# Patient Record
Sex: FEMALE | URBAN - METROPOLITAN AREA
[De-identification: names, ages, dates, MRNs, and addresses within clinical notes are randomized per-mention and may not be internally consistent; named-entity substitution may affect disease eponyms.]

---

## 2017-07-17 RX ORDER — PHENYLEPHRINE HYDROCHLORIDE 25 MG/ML
1 SOLUTION/ DROPS OPHTHALMIC
Status: CANCELLED | OUTPATIENT
Start: 2017-07-17

## 2017-07-18 ENCOUNTER — HOSPITAL ENCOUNTER (OUTPATIENT)
Facility: CLINIC | Age: 67
End: 2017-07-18
Attending: OPHTHALMOLOGY | Admitting: OPHTHALMOLOGY
Payer: COMMERCIAL

## 2017-08-08 RX ORDER — MULTIPLE VITAMINS W/ MINERALS TAB 9MG-400MCG
1 TAB ORAL DAILY
Status: ON HOLD | COMMUNITY
End: 2017-08-09

## 2017-08-09 ENCOUNTER — ANESTHESIA EVENT (OUTPATIENT)
Dept: SURGERY | Facility: CLINIC | Age: 67
End: 2017-08-09
Payer: COMMERCIAL

## 2017-08-09 ENCOUNTER — ANESTHESIA (OUTPATIENT)
Dept: SURGERY | Facility: CLINIC | Age: 67
End: 2017-08-09
Payer: COMMERCIAL

## 2017-08-09 ENCOUNTER — HOSPITAL ENCOUNTER (OUTPATIENT)
Facility: CLINIC | Age: 67
Discharge: HOME | End: 2017-08-09
Attending: OPHTHALMOLOGY | Admitting: OPHTHALMOLOGY
Payer: COMMERCIAL

## 2017-08-09 VITALS
DIASTOLIC BLOOD PRESSURE: 73 MMHG | BODY MASS INDEX: 25.81 KG/M2 | HEIGHT: 61 IN | SYSTOLIC BLOOD PRESSURE: 131 MMHG | TEMPERATURE: 97 F | WEIGHT: 136.69 LBS | OXYGEN SATURATION: 97 % | RESPIRATION RATE: 14 BRPM

## 2017-08-09 PROCEDURE — 27210794 ZZH OR GENERAL SUPPLY STERILE: Performed by: OPHTHALMOLOGY

## 2017-08-09 PROCEDURE — 36000104 ZZH EYE SURGERY LEVEL 4 1ST 30 MIN: Performed by: OPHTHALMOLOGY

## 2017-08-09 PROCEDURE — 36000105 ZZH EYE SURGERY LEVEL 4 EA 15 ADDTL MIN: Performed by: OPHTHALMOLOGY

## 2017-08-09 PROCEDURE — 40000170 ZZH STATISTIC PRE-PROCEDURE ASSESSMENT II: Performed by: OPHTHALMOLOGY

## 2017-08-09 PROCEDURE — V2632 POST CHMBR INTRAOCULAR LENS: HCPCS | Performed by: OPHTHALMOLOGY

## 2017-08-09 PROCEDURE — 25000308 HC RX OP HPI UCR WEL MED 250 IP 250: Performed by: OPHTHALMOLOGY

## 2017-08-09 PROCEDURE — 71000028 ZZH EYE RECOVERY PHASE 2 EACH 15 MINS: Performed by: OPHTHALMOLOGY

## 2017-08-09 PROCEDURE — 25000125 ZZHC RX 250: Performed by: ANESTHESIOLOGY

## 2017-08-09 PROCEDURE — 25000128 H RX IP 250 OP 636: Performed by: NURSE ANESTHETIST, CERTIFIED REGISTERED

## 2017-08-09 PROCEDURE — 25000128 H RX IP 250 OP 636: Performed by: OPHTHALMOLOGY

## 2017-08-09 PROCEDURE — 25000125 ZZHC RX 250: Performed by: OPHTHALMOLOGY

## 2017-08-09 PROCEDURE — 27210995 ZZH RX 272: Performed by: OPHTHALMOLOGY

## 2017-08-09 PROCEDURE — 25000128 H RX IP 250 OP 636: Performed by: ANESTHESIOLOGY

## 2017-08-09 PROCEDURE — 37000009 ZZH ANESTHESIA TECHNICAL FEE, EACH ADDTL 15 MIN: Performed by: OPHTHALMOLOGY

## 2017-08-09 PROCEDURE — 36000135 ZZH KERATOTOMY ARCUATE W FEMTOSECOND LASER/IMAGING FOR ATIOL: Performed by: OPHTHALMOLOGY

## 2017-08-09 PROCEDURE — 37000008 ZZH ANESTHESIA TECHNICAL FEE, 1ST 30 MIN: Performed by: OPHTHALMOLOGY

## 2017-08-09 PROCEDURE — S0020 INJECTION, BUPIVICAINE HYDRO: HCPCS | Performed by: OPHTHALMOLOGY

## 2017-08-09 DEVICE — EYE IMP IOL AMO PCL TECNIS ZCB00 19.0: Type: IMPLANTABLE DEVICE | Site: EYE | Status: FUNCTIONAL

## 2017-08-09 RX ORDER — NALOXONE HYDROCHLORIDE 0.4 MG/ML
.1-.4 INJECTION, SOLUTION INTRAMUSCULAR; INTRAVENOUS; SUBCUTANEOUS
Status: DISCONTINUED | OUTPATIENT
Start: 2017-08-09 | End: 2017-08-09 | Stop reason: HOSPADM

## 2017-08-09 RX ORDER — BALANCED SALT SOLUTION 6.4; .75; .48; .3; 3.9; 1.7 MG/ML; MG/ML; MG/ML; MG/ML; MG/ML; MG/ML
SOLUTION OPHTHALMIC PRN
Status: DISCONTINUED | OUTPATIENT
Start: 2017-08-09 | End: 2017-08-09 | Stop reason: HOSPADM

## 2017-08-09 RX ORDER — DEXAMETHASONE SODIUM PHOSPHATE 4 MG/ML
INJECTION, SOLUTION INTRA-ARTICULAR; INTRALESIONAL; INTRAMUSCULAR; INTRAVENOUS; SOFT TISSUE PRN
Status: DISCONTINUED | OUTPATIENT
Start: 2017-08-09 | End: 2017-08-09 | Stop reason: HOSPADM

## 2017-08-09 RX ORDER — PROPARACAINE HYDROCHLORIDE 5 MG/ML
SOLUTION/ DROPS OPHTHALMIC PRN
Status: DISCONTINUED | OUTPATIENT
Start: 2017-08-09 | End: 2017-08-09 | Stop reason: HOSPADM

## 2017-08-09 RX ORDER — DICLOFENAC SODIUM 1 MG/ML
1 SOLUTION/ DROPS OPHTHALMIC
Status: COMPLETED | OUTPATIENT
Start: 2017-08-09 | End: 2017-08-09

## 2017-08-09 RX ORDER — LIDOCAINE HYDROCHLORIDE 10 MG/ML
INJECTION, SOLUTION EPIDURAL; INFILTRATION; INTRACAUDAL; PERINEURAL PRN
Status: DISCONTINUED | OUTPATIENT
Start: 2017-08-09 | End: 2017-08-09 | Stop reason: HOSPADM

## 2017-08-09 RX ORDER — ONDANSETRON 4 MG/1
4 TABLET, ORALLY DISINTEGRATING ORAL EVERY 30 MIN PRN
Status: DISCONTINUED | OUTPATIENT
Start: 2017-08-09 | End: 2017-08-09 | Stop reason: HOSPADM

## 2017-08-09 RX ORDER — PHENYLEPHRINE HYDROCHLORIDE 25 MG/ML
1 SOLUTION/ DROPS OPHTHALMIC
Status: COMPLETED | OUTPATIENT
Start: 2017-08-09 | End: 2017-08-09

## 2017-08-09 RX ORDER — SODIUM CHLORIDE, SODIUM LACTATE, POTASSIUM CHLORIDE, CALCIUM CHLORIDE 600; 310; 30; 20 MG/100ML; MG/100ML; MG/100ML; MG/100ML
INJECTION, SOLUTION INTRAVENOUS CONTINUOUS
Status: DISCONTINUED | OUTPATIENT
Start: 2017-08-09 | End: 2017-08-09 | Stop reason: HOSPADM

## 2017-08-09 RX ORDER — ONDANSETRON 2 MG/ML
INJECTION INTRAMUSCULAR; INTRAVENOUS PRN
Status: DISCONTINUED | OUTPATIENT
Start: 2017-08-09 | End: 2017-08-09

## 2017-08-09 RX ORDER — ONDANSETRON 2 MG/ML
4 INJECTION INTRAMUSCULAR; INTRAVENOUS EVERY 30 MIN PRN
Status: DISCONTINUED | OUTPATIENT
Start: 2017-08-09 | End: 2017-08-09 | Stop reason: HOSPADM

## 2017-08-09 RX ORDER — DEXAMETHASONE SODIUM PHOSPHATE 4 MG/ML
INJECTION, SOLUTION INTRA-ARTICULAR; INTRALESIONAL; INTRAMUSCULAR; INTRAVENOUS; SOFT TISSUE PRN
Status: DISCONTINUED | OUTPATIENT
Start: 2017-08-09 | End: 2017-08-09

## 2017-08-09 RX ORDER — PROPOFOL 10 MG/ML
INJECTION, EMULSION INTRAVENOUS PRN
Status: DISCONTINUED | OUTPATIENT
Start: 2017-08-09 | End: 2017-08-09

## 2017-08-09 RX ORDER — FENTANYL CITRATE 50 UG/ML
INJECTION, SOLUTION INTRAMUSCULAR; INTRAVENOUS PRN
Status: DISCONTINUED | OUTPATIENT
Start: 2017-08-09 | End: 2017-08-09

## 2017-08-09 RX ORDER — FENTANYL CITRATE 50 UG/ML
25-50 INJECTION, SOLUTION INTRAMUSCULAR; INTRAVENOUS
Status: DISCONTINUED | OUTPATIENT
Start: 2017-08-09 | End: 2017-08-09 | Stop reason: HOSPADM

## 2017-08-09 RX ORDER — TROPICAMIDE 10 MG/ML
1 SOLUTION/ DROPS OPHTHALMIC
Status: COMPLETED | OUTPATIENT
Start: 2017-08-09 | End: 2017-08-09

## 2017-08-09 RX ORDER — TIMOLOL MALEATE 5 MG/ML
SOLUTION/ DROPS OPHTHALMIC PRN
Status: DISCONTINUED | OUTPATIENT
Start: 2017-08-09 | End: 2017-08-09 | Stop reason: HOSPADM

## 2017-08-09 RX ORDER — MEPERIDINE HYDROCHLORIDE 25 MG/ML
12.5 INJECTION INTRAMUSCULAR; INTRAVENOUS; SUBCUTANEOUS
Status: DISCONTINUED | OUTPATIENT
Start: 2017-08-09 | End: 2017-08-09 | Stop reason: HOSPADM

## 2017-08-09 RX ORDER — PROPARACAINE HYDROCHLORIDE 5 MG/ML
1 SOLUTION/ DROPS OPHTHALMIC ONCE
Status: COMPLETED | OUTPATIENT
Start: 2017-08-09 | End: 2017-08-09

## 2017-08-09 RX ORDER — MOXIFLOXACIN 5 MG/ML
1 SOLUTION/ DROPS OPHTHALMIC
Status: COMPLETED | OUTPATIENT
Start: 2017-08-09 | End: 2017-08-09

## 2017-08-09 RX ORDER — CYCLOPENTOLATE HYDROCHLORIDE 10 MG/ML
1 SOLUTION/ DROPS OPHTHALMIC
Status: COMPLETED | OUTPATIENT
Start: 2017-08-09 | End: 2017-08-09

## 2017-08-09 RX ORDER — BRIMONIDINE TARTRATE 2 MG/ML
SOLUTION/ DROPS OPHTHALMIC PRN
Status: DISCONTINUED | OUTPATIENT
Start: 2017-08-09 | End: 2017-08-09 | Stop reason: HOSPADM

## 2017-08-09 RX ADMIN — FENTANYL CITRATE 25 MCG: 50 INJECTION, SOLUTION INTRAMUSCULAR; INTRAVENOUS at 08:56

## 2017-08-09 RX ADMIN — FENTANYL CITRATE 25 MCG: 50 INJECTION, SOLUTION INTRAMUSCULAR; INTRAVENOUS at 08:40

## 2017-08-09 RX ADMIN — ONDANSETRON 4 MG: 2 INJECTION INTRAMUSCULAR; INTRAVENOUS at 08:05

## 2017-08-09 RX ADMIN — CYCLOPENTOLATE HYDROCHLORIDE 1 DROP: 10 SOLUTION/ DROPS OPHTHALMIC at 06:32

## 2017-08-09 RX ADMIN — POVIDONE-IODINE 1 DROP: 5 SOLUTION OPHTHALMIC at 06:32

## 2017-08-09 RX ADMIN — CYCLOPENTOLATE HYDROCHLORIDE 1 DROP: 10 SOLUTION/ DROPS OPHTHALMIC at 06:42

## 2017-08-09 RX ADMIN — FENTANYL CITRATE 50 MCG: 50 INJECTION, SOLUTION INTRAMUSCULAR; INTRAVENOUS at 07:58

## 2017-08-09 RX ADMIN — DICLOFENAC SODIUM 1 DROP: 1 SOLUTION/ DROPS OPHTHALMIC at 06:32

## 2017-08-09 RX ADMIN — CYCLOPENTOLATE HYDROCHLORIDE 1 DROP: 10 SOLUTION/ DROPS OPHTHALMIC at 06:58

## 2017-08-09 RX ADMIN — PHENYLEPHRINE HYDROCHLORIDE 1 DROP: 2.5 SOLUTION/ DROPS OPHTHALMIC at 06:58

## 2017-08-09 RX ADMIN — PHENYLEPHRINE HYDROCHLORIDE 1 DROP: 2.5 SOLUTION/ DROPS OPHTHALMIC at 06:42

## 2017-08-09 RX ADMIN — PROPOFOL 30 MG: 10 INJECTION, EMULSION INTRAVENOUS at 08:05

## 2017-08-09 RX ADMIN — DEXAMETHASONE SODIUM PHOSPHATE 4 MG: 4 INJECTION, SOLUTION INTRA-ARTICULAR; INTRALESIONAL; INTRAMUSCULAR; INTRAVENOUS; SOFT TISSUE at 08:05

## 2017-08-09 RX ADMIN — PROPARACAINE HYDROCHLORIDE 1 DROP: 5 SOLUTION/ DROPS OPHTHALMIC at 06:31

## 2017-08-09 RX ADMIN — MIDAZOLAM HYDROCHLORIDE 2 MG: 1 INJECTION, SOLUTION INTRAMUSCULAR; INTRAVENOUS at 07:58

## 2017-08-09 RX ADMIN — SODIUM CHLORIDE, POTASSIUM CHLORIDE, SODIUM LACTATE AND CALCIUM CHLORIDE: 600; 310; 30; 20 INJECTION, SOLUTION INTRAVENOUS at 07:58

## 2017-08-09 RX ADMIN — MOXIFLOXACIN HYDROCHLORIDE 1 DROP: 5 SOLUTION/ DROPS OPHTHALMIC at 06:32

## 2017-08-09 RX ADMIN — MOXIFLOXACIN HYDROCHLORIDE 1 DROP: 5 SOLUTION/ DROPS OPHTHALMIC at 06:42

## 2017-08-09 RX ADMIN — TROPICAMIDE 1 DROP: 10 SOLUTION/ DROPS OPHTHALMIC at 06:58

## 2017-08-09 RX ADMIN — DICLOFENAC SODIUM 1 DROP: 1 SOLUTION/ DROPS OPHTHALMIC at 06:58

## 2017-08-09 RX ADMIN — TROPICAMIDE 1 DROP: 10 SOLUTION/ DROPS OPHTHALMIC at 06:42

## 2017-08-09 RX ADMIN — TROPICAMIDE 1 DROP: 10 SOLUTION/ DROPS OPHTHALMIC at 06:32

## 2017-08-09 RX ADMIN — LIDOCAINE HYDROCHLORIDE 1 ML: 10 INJECTION, SOLUTION EPIDURAL; INFILTRATION; INTRACAUDAL; PERINEURAL at 06:59

## 2017-08-09 RX ADMIN — DICLOFENAC SODIUM 1 DROP: 1 SOLUTION/ DROPS OPHTHALMIC at 06:42

## 2017-08-09 RX ADMIN — PHENYLEPHRINE HYDROCHLORIDE 1 DROP: 2.5 SOLUTION/ DROPS OPHTHALMIC at 06:32

## 2017-08-09 RX ADMIN — MOXIFLOXACIN HYDROCHLORIDE 1 DROP: 5 SOLUTION/ DROPS OPHTHALMIC at 06:58

## 2017-08-09 NOTE — ANESTHESIA POSTPROCEDURE EVALUATION
Patient: Patricia Valentin    Procedure(s):  LEFT EYE PHACOEMULSIFICATION CLEAR CORNEA WITH STANDARD INTRAOCULAR LENS IMPLANT FEMTOSECOND LASER ASSISTED  LEFT EYE VITRECTOMY PARSPLANA WITH 25 GAUGE SYSTEM MEMBRANE PEEL  - Wound Class: I-Clean   - Wound Class: I-Clean    Diagnosis:CATARACT, EPIRETINAL MEMBRANE  Diagnosis Additional Information: No value filed.    Anesthesia Type:  MAC    Note:  Anesthesia Post Evaluation    Patient location during evaluation: bedside  Patient participation: Able to fully participate in evaluation  Level of consciousness: awake  Pain management: adequate  Airway patency: patent  Cardiovascular status: acceptable  Respiratory status: acceptable  Hydration status: acceptable  PONV: none     Anesthetic complications: None    Comments: No anesthetic complications noted.         Last vitals:  Vitals:    08/09/17 0634 08/09/17 0919 08/09/17 0941   BP: 132/62 138/73 131/73   Resp: 16 14 14   Temp: 36.1  C (97  F)     SpO2: 98% 98% 97%         Electronically Signed By: Chicho Amos DO, DO  August 9, 2017  4:02 PM

## 2017-08-09 NOTE — IP AVS SNAPSHOT
[facility]    [address]    [place] [address]    Phone:  [phone]    Fax:  [phone]                                       After Visit Summary   8/9/2017    Patricia Valentin    MRN: [id number]           After Visit Summary Signature Page     I have received my discharge instructions, and my questions have been answered. I have discussed any challenges I see with this plan with the nurse or doctor.    ..........................................................................................................................................  Patient/Patient Representative Signature      ..........................................................................................................................................  Patient Representative Print Name and Relationship to Patient    ..................................................               ................................................  Date                                            Time    ..........................................................................................................................................  Reviewed by Signature/Title    ...................................................              ..............................................  Date                                                            Time

## 2017-08-09 NOTE — OP NOTE
DATE OF PROCEDURE:  08/09/2017       PREOPERATIVE DIAGNOSES:   1.  Left macular pucker.   2.  Left cataract.      POSTOPERATIVE DIAGNOSES:     1.  Left macular pucker.     2.  Left cataract.      PROCEDURES:   1.  Left vitrectomy.   2.  Left membrane peel.   3.  Left cataract extraction.      COMPLICATIONS:  None.      ESTIMATED BLOOD LOSS:  Less than 5 mL.      INDICATIONS:  To improve the vision.  The risks of the operation, including 1:1000 risk of infection, hemorrhage and potential loss of all eye sight, 1:100 risk of retinal detachment requiring more surgery, and uncertain visual prognosis were explained to her.  She wished to proceed.      DESCRIPTION OF PROCEDURE:  The cataract extraction was performed uneventfully by Dr. Merlin Christiansen.  The operation was then turned over to me.  Wire speculum was inserted in the left fornix.  The eye had already been prepped and draped in the usual fashion and the anesthetic block had already been given.  The eye was entered 4 mm posterior to the limbus in the inferotemporal quadrant with a 25-gauge trocar, which was directly visualized in the vitreous cavity and connected to 1 liter bottle of BSS to which 0.3 mL of 1:1000 adrenalin was added.  Two more 25-gauge trocars were inserted supratemporally and superonasally 4 mm posterior to the limbus.  The eye was entered with a light pipe and vitreous cutter, and a core vitrectomy was performed under wide-angle illumination.  A posterior vitreous attachment was induced with active suction, and the vitreous was removed for 360 degrees out to the vitreous base.  Attention was then turned to the posterior pole, where a macular pucker was present.  Using a combination of a barbed MVR blade, Rice pick and ILM forceps, the membrane was incised, delaminated and removed in 1 nice sheet.  Following this, a 360-degree scleral depression examination showed no peripheral retinal tears.  The 3 trocars were removed, and all 3 sclerotomies  closed with 6-0 plain gut suture.  The intraocular pressure was approximately 10.  Subconjunctival injections of Ancef and Decadron were given.  Alphagan, Timolol and Betadine were instilled in the left fornix.  The speculum was removed and the eye was patched in the usual fashion.  No complications were noted.         NÉSTOR THOMAS MD             D: 2017 09:20   T: 2017 10:29   MT: [id number]      Name:     PIEDAD KABA   MRN:      [id number]        Account:        [id number]   :      1950           Procedure Date: 2017      Document: [id number]

## 2017-08-09 NOTE — ANESTHESIA PREPROCEDURE EVALUATION
Anesthesia Evaluation     .             ROS/MED HX    ENT/Pulmonary:      (-) sleep apnea   Neurologic:  - neg neurologic ROS     Cardiovascular:  - neg cardiovascular ROS       METS/Exercise Tolerance:     Hematologic:  - neg hematologic  ROS       Musculoskeletal:  - neg musculoskeletal ROS       GI/Hepatic:        (-) GERD   Renal/Genitourinary:  - ROS Renal section negative       Endo:  - neg endo ROS       Psychiatric:     (+) psychiatric history depression      Infectious Disease:  - neg infectious disease ROS       Malignancy:         Other:                     Physical Exam  Normal systems: cardiovascular, pulmonary and dental    Airway   Mallampati: II  TM distance: >3 FB  Neck ROM: full    Dental     Cardiovascular       Pulmonary                     Anesthesia Plan      History & Physical Review  History and physical reviewed and following examination; no interval change.    ASA Status:  2 .    NPO Status:  > 8 hours    Plan for MAC Reason for MAC:  Procedure to face, neck, head or breast  PONV prophylaxis:  Ondansetron (or other 5HT-3)       Postoperative Care      Consents  Anesthetic plan, risks, benefits and alternatives discussed with:  Patient..        Procedure: Procedure(s):  COMBINED VITRECTOMY PARSPLANA, PHACOEMULSIFICATION CLEAR CORNEA WITH STANDARD INTRAOCULAR LENS IMPLANT  VITRECTOMY PARSPLANA WITH 25 GAUGE SYSTEM  Preop diagnosis: CATARACT, EPIRETINAL MEMBRANE    Allergies   Allergen Reactions     No Known Allergies      Past Medical History:   Diagnosis Date     Agoraphobia with panic disorder      Major depressive disorder, recurrent episode (H)      NO ACTIVE PROBLEMS      Vitamin D deficiency      Past Surgical History:   Procedure Laterality Date     C/SECTION, LOW TRANSVERSE      , Low Transverse     CHOLECYSTECTOMY       HERNIA REPAIR      ventral     Prior to Admission medications    Not on File     Current Facility-Administered Medications Ordered in Epic   Medication  Dose Route Frequency Last Rate Last Dose     cyclopentolate (CYCLOGYL) 1 % ophthalmic solution 1 drop  1 drop Ophthalmic q5 Min Prior to Surgery   1 drop at 08/09/17 0632     phenylephrine (MYDFRIN /JOY-SYNEPHRINE) 2.5 % ophthalmic solution 1 drop  1 drop Ophthalmic q5 Min Prior to Surgery   1 drop at 08/09/17 0632     tropicamide (MYDRIACYL) 1 % ophthalmic solution 1 drop  1 drop Ophthalmic q5 Min Prior to Surgery   1 drop at 08/09/17 0632     moxifloxacin (VIGAMOX) 0.5 % ophthalmic solution 1 drop  1 drop Ophthalmic q5 Min Prior to Surgery   1 drop at 08/09/17 0632     diclofenac (VOLTAREN) 0.1 % ophthalmic solution 1 drop  1 drop Ophthalmic q5 Min Prior to Surgery   1 drop at 08/09/17 0632     No current [facility]-ordered outpatient prescriptions on file.     Wt Readings from Last 1 Encounters:   08/08/17 62 kg (136 lb 11 oz)     Temp Readings from Last 1 Encounters:   No data found for Temp     BP Readings from Last 6 Encounters:   06/28/06 120/64   08/10/04 110/70     Pulse Readings from Last 4 Encounters:   No data found for Pulse     Resp Readings from Last 1 Encounters:   No data found for Resp     SpO2 Readings from Last 1 Encounters:   No data found for SpO2     Recent Labs   Lab Test  09/29/09   1230   NA  139   POTASSIUM  3.9   CHLORIDE  103   CO2  31   ANIONGAP  6   GLC  96   BUN  18   CR  0.76   NINA  8.9     Recent Labs   Lab Test  09/29/09   1230   AST  22   ALT  14     Recent Labs   Lab Test  09/29/09   1230   WBC  4.6   HGB  13.1   PLT  393     No results for input(s): INR in the last 33859 hours.    Invalid input(s): APTT   No results for input(s): TROPI in the last 82712 hours.  RECENT LABS:   ECG:   ECHO:   CXR:                      .

## 2017-08-09 NOTE — DISCHARGE INSTRUCTIONS
[facility] Discharge  Instructions  Anesthesia ([facility])   Adult Discharge Instructions    For 24 hours after surgery    1. Get plenty of rest.  Make arrangements to have a responsible adult stay with you for at least 6 hours after you leave the hospital.  2. Do not drive or use heavy equipment for 24 hours.    3. Do not drink alcohol for 24 hours.  4. Do not sign legal documents or make important decisions for 24 hours.  5. Avoid strenuous or risky activities. You may feel lightheaded.  If so, sit for a few minutes before standing.  Have someone help you get up.   6. Conscious sedation patients may resume a regular diet..  7. Any questions of medical nature, call your physician.    Dr. Merlin Christiansen  Post Operative Care  Following Cataract Surgery     ? If you have a gauze eye patch on, please do not remove it until it is removed by your physician at your first post-operative visit.    ? Wear the eye shield when sleeping for protection for one week.    ? Do not rub the operated eye.    ? Light sensitivity may be noticed. Sunglasses may be worn for comfort.    ? Some discomfort and irritation may be noticed. Acetaminophen (Tylenol) may be taken for discomfort.    ? Avoid excessive bending over, strenuous activity or heavy lifting until approved by your doctor.    ? Keep the operated eye dry.    ? You may wash your hair, bathe or shower, but keep the operated eye closed while doing so.    ? Use medication exactly as prescribed by your doctor.  You may restart your regular home medications.    ? Bring all materials and medications to the clinic on your first post-operative visit.    ? Call the doctor s office if any of the following occur:     -decrease in vision     -pain beyond mild sensation     -significant redness or discharge      Dr. Christiansen can reached at [phone].      [facility]  JESSICA STEWART  [address]  [place]  [address]  [phone]    PATIENT INSTRUCTIONS - RETINA SURGERY    Common retina operations    Surgery for retinal detachments.    Surgery to remove blood in the eye.    Surgery to remove scar tissue from retina.    Surgery to close macular holes.    Surgery to repair dislocated lens    After the surgery    Many retina operations involve the use of gas bubbles, or oil bubbles in the eye.  If this is true in your case, you may be asked to position a certain way following the surgery.  The nurse will go over this in detail with you.  You do not have a gas bubble or oil bubble in your eye.    When you go home, you can eat and drink as much as you feel comfortable.  Many retina operations make you feel less hungry or even a little nauseous.  This is to be expected.     You don t need to start the eye drops until the next day.  Please bring these drops with you to the doctor.      You may take a bath or shower as usual for you the day after surgury,   If the patch falls off, please simply leave it off.    It is normal to have some moderate discomfort.    If nothing was prescribed for you, you can take ibuprofen(Advil) or acetaminopen (Tylenol) as directed on the bottle. If you have significant discomfort that isn t relieved with pain medications, you should call [facility] at [phone] and speak to your doctor.    Recovering after surgery    Retina operations are not like cataract surgery.  The vision often takes weeks or months to fully improve following the surgery.  DO NOT BE DISCOURAGED IF YOU DON T SEE WELL IMMEDIATELY FOLLOWING THE SURGERY.  You will have eye drops to use over the first several weeks following the surgery.  The doctor will give you detailed instructions on when to take them on your post-op visit.    If positioning is required following the surgery, it usually is required for 5-7 days.    Most people do not feel able to return to work for at least one week and  sometimes up to two or three weeks following the surgery.    Frequently asked questions    What symptoms should concern me following surgery?    You should be concerned if:    The eye becomes increasingly more painful and the pain medication stops working.    The vision gets darker or dimmer.    Green thick discharge appears.    What are the drops for?    One drop will be an antibiotic.  It is meant to prevent infection.    One drop will be Pred Forte.  It is a steroid drop.  It is meant to reduce inflammation and promote healing.  It usually is continued for the longest time and is gradually tapered over several weeks.

## 2017-08-09 NOTE — OP NOTE
PATIENT: Patricia Valentin , : 1950     DATE OF SURGERY: 2017     SURGEON: Merlin Christiansen MD     PREOPERATIVE DIAGNOSIS: Cataract, Left  Left eye.     POSTOPERATIVE DIAGNOSIS: Cataract, Left  Left eye.      PROCEDURE : Phacoemulsification with intraocular lens implant in the Left  Left eye.  Femto Laser Assisted.     ANESTHESIA: Topical  with MAC.       INDICATIONS FOR PROCEDURE:  The patient is a 67 year old-year-old female with a history of gradually decreasing vision in the Left  Left eye.  Best corrected visual acuity had dropped to 20/40 causing difficulty with reading and driving.  A complete ophthalmic evaluation demonstrated that the cataract was a significant cause in the decreased visual acuity.  The patient also has an epiretinal membrane.  The patient was evaluated by Dr. Ric Whitlock, and Patricia opted for a combined procedure with the cataract removing being performed first and the epiretinal membrane being removed secondarily.  The benefits, risks, and alternatives of cataract surgery  (including: bleeding, infection, loss of vision, loss of the eye, need for further surgery and other rare but other possible side effects of cataract surgery) were discussed with the patient.  The patient expressed understanding of these risks and elected to proceed with surgery.  Written consent was obtained prior to the procedure.      DESCRIPTION OF PROCEDURE :  The patient's right eye was dilated in preinduction with topical drops of cyclopentolate, phenylephrine and flurbiprofen.  Topical antibiotic drops were instilled.      Patient was taken to the femto laser room and the femto laser was used to assist with capsulotomy, fragmentation, arcuate incisions, and incisions.    The patient was taken to the operating room.  A drop of tetracaine and  a drop of 5% iodine solution was placed in the eye.  The operative eye was prepped and draped in the usual sterile manner for intraocular surgery.  A lid  speculum was placed to separate the eyelids.     A paracentesis incision was opened at the 2 clock hours left of the temporal position in the Left  Left eye.  One percent preservative free intraocular lidocaine was then instilled in the eye.  Next, the anterior chamber was deepened with viscoelastic.  A limbal-based clear corneal incision was opened temporally.  A continuous curvilinear anterior capsulorrhexis was then opened using a Utrata forceps.  Next, balanced saline solution on a blunt-tip cannula was used to carry out hydrodissection and hydrodelineation and the lens nucleus was rotated freely within the lens capsule.  Phacoemulsification was then performed in the nucleus and epinucleus.  The residual cortical material was aspirated using the irrigation/aspiration handpiece.      The capsular bag was inspected and found to be intact.  The capsular bag was then deepened with additional viscoelastic.  A posterior chamber intraocular lens, TANK [id number], 19.0 diopter,  was inserted into the lens capsule and rotated into position without difficulty.  A dilute mixture of intraocular antibiotic and balanced saline solution 1:10 was injected into the anterior chamber.  The incision was inspected for leaks and found to be sealed.  The incision was closed with a single 10-0 nylon suture.  The patient tolerated the procedure well without complications. Dr. Whitlock then begin surgery to remove the epiretinal membrane.          RIRI MATHEW MD

## 2017-08-09 NOTE — BRIEF OP NOTE
preop dx - left macular pucker, cataract  Post op dx - same  Proc - left ppv, mp, cat extracton  Comp- none  ebl - zero

## 2017-08-09 NOTE — IP AVS SNAPSHOT
MRN:[id number]                      After Visit Summary   8/9/2017    Patricia Valentin    MRN: [id number]           Thank you!     Thank you for choosing [facility] for your care. Our goal is always to provide you with excellent care. Hearing back from our patients is one way we can continue to improve our services. Please take a few minutes to complete the written survey that you may receive in the mail after you visit with us. Thank you!        Patient Information     Date Of Birth          1950        About your hospital stay     You were admitted on:  August 9, 2017 You last received care in the:  [facility]    You were discharged on:  August 9, 2017       Who to Call     For medical emergencies, please call 911.  For non-urgent questions about your medical care, please call your primary care provider or clinic, [phone]  For questions related to your surgery, please call your surgery clinic        Attending Provider     Provider Merlin Mccarty MD Ophthalmology       Primary Care Provider Office Phone # Fax #    Tamia MOORE Caterina [phone]      After Care Instructions     Activity       Avoid strenuous activities the next several days.                  Further instructions from your care team       [facility] Discharge  Instructions  Anesthesia ([facility])   Adult Discharge Instructions    For 24 hours after surgery    1. Get plenty of rest.  Make arrangements to have a responsible adult stay with you for at least 6 hours after you leave the hospital.  2. Do not drive or use heavy equipment for 24 hours.    3. Do not drink alcohol for 24 hours.  4. Do not sign legal documents or make important decisions for 24 hours.  5. Avoid strenuous or risky activities. You may feel lightheaded.  If so, sit for a few minutes before standing.  Have someone help you get up.   6. Conscious sedation patients may  resume a regular diet..  7. Any questions of medical nature, call your physician.    Dr. Merlin Christiansen  Post Operative Care  Following Cataract Surgery     ? If you have a gauze eye patch on, please do not remove it until it is removed by your physician at your first post-operative visit.    ? Wear the eye shield when sleeping for protection for one week.    ? Do not rub the operated eye.    ? Light sensitivity may be noticed. Sunglasses may be worn for comfort.    ? Some discomfort and irritation may be noticed. Acetaminophen (Tylenol) may be taken for discomfort.    ? Avoid excessive bending over, strenuous activity or heavy lifting until approved by your doctor.    ? Keep the operated eye dry.    ? You may wash your hair, bathe or shower, but keep the operated eye closed while doing so.    ? Use medication exactly as prescribed by your doctor.  You may restart your regular home medications.    ? Bring all materials and medications to the clinic on your first post-operative visit.    ? Call the doctor s office if any of the following occur:     -decrease in vision     -pain beyond mild sensation     -significant redness or discharge      Dr. Christiansen can reached at [phone].      [facility]  JESSICA STEWART  [address]  [place] [address] [phone]    PATIENT INSTRUCTIONS - RETINA SURGERY    Common retina operations    Surgery for retinal detachments.    Surgery to remove blood in the eye.    Surgery to remove scar tissue from retina.    Surgery to close macular holes.    Surgery to repair dislocated lens    After the surgery    Many retina operations involve the use of gas bubbles, or oil bubbles in the eye.  If this is true in your case, you may be asked to position a certain way following the surgery.  The nurse will go over this in detail with you.  You do not have a gas bubble or oil bubble in your eye.    When you go home, you can eat and drink as  much as you feel comfortable.  Many retina operations make you feel less hungry or even a little nauseous.  This is to be expected.     You don t need to start the eye drops until the next day.  Please bring these drops with you to the doctor.      You may take a bath or shower as usual for you the day after surgury,   If the patch falls off, please simply leave it off.    It is normal to have some moderate discomfort.    If nothing was prescribed for you, you can take ibuprofen(Advil) or acetaminopen (Tylenol) as directed on the bottle. If you have significant discomfort that isn t relieved with pain medications, you should call [facility] at [phone] and speak to your doctor.    Recovering after surgery    Retina operations are not like cataract surgery.  The vision often takes weeks or months to fully improve following the surgery.  DO NOT BE DISCOURAGED IF YOU DON T SEE WELL IMMEDIATELY FOLLOWING THE SURGERY.  You will have eye drops to use over the first several weeks following the surgery.  The doctor will give you detailed instructions on when to take them on your post-op visit.    If positioning is required following the surgery, it usually is required for 5-7 days.    Most people do not feel able to return to work for at least one week and sometimes up to two or three weeks following the surgery.    Frequently asked questions    What symptoms should concern me following surgery?    You should be concerned if:    The eye becomes increasingly more painful and the pain medication stops working.    The vision gets darker or dimmer.    Green thick discharge appears.    What are the drops for?    One drop will be an antibiotic.  It is meant to prevent infection.    One drop will be Pred Forte.  It is a steroid drop.  It is meant to reduce inflammation and promote healing.  It usually is continued for the longest time and is gradually tapered over several weeks.      Pending Results     No orders  "found from 2017 to 8/10/2017.            Admission Information     Date & Time Provider Department Dept. Phone    2017 Merlin Christiansen MD [facility] [phone]      Your Vitals Were     Blood Pressure Temperature Respirations Height Weight Last Period    138/73 97  F (36.1  C) (Temporal) 14 1.549 m (5' 1\") 62 kg (136 lb 11 oz) 2006    Pulse Oximetry BMI (Body Mass Index)                98% 25.83 kg/m2          [employer] Information     [employer] lets you send messages to your doctor, view your test results, renew your prescriptions, schedule appointments and more. To sign up, go to [email or web address] . Click on \"Log in\" on the left side of the screen, which will take you to the Welcome page. Then click on \"Sign up Now\" on the right side of the page.     You will be asked to enter the access code listed below, as well as some personal information. Please follow the directions to create your username and password.     Your access code is: [email or web address]  Expires: 2017  9:39 AM     Your access code will  in 90 days. If you need help or a new code, please call your [facility] clinic or [phone].        Care EveryWhere ID     This is your Care EveryWhere ID. This could be used by other organizations to access your [facility] medical records  [id number]        Equal Access to Services     SAURABH MARCELINO AH: Hadii marika goldmano Soshanthi, waaxda luqadaha, qaybta kaalmada adeegyada, ross young. So [facility] [phone].    ATENCIÓN: Si habla español, tiene a michel disposición servicios gratuitos de asistencia lingüística. Llame al [phone].    We comply with applicable federal civil rights laws and [place] laws. We do not discriminate on the basis of race, color, national origin, age, disability sex, sexual orientation or gender identity.               Review of your medicines      Notice     You have not been prescribed any medications.             " Protect others around you: Learn how to safely use, store and throw away your medicines at www.disposemymeds.org.             Medication List: This is a list of all your medications and when to take them. Check marks below indicate your daily home schedule. Keep this list as a reference.      Notice     You have not been prescribed any medications.

## 2017-08-17 ENCOUNTER — HOSPITAL ENCOUNTER (OUTPATIENT)
Facility: CLINIC | Age: 67
End: 2017-08-17
Attending: OPHTHALMOLOGY | Admitting: OPHTHALMOLOGY
Payer: COMMERCIAL

## 2017-10-05 ENCOUNTER — SURGERY (OUTPATIENT)
Age: 67
End: 2017-10-05

## 2017-10-05 ENCOUNTER — HOSPITAL ENCOUNTER (OUTPATIENT)
Facility: CLINIC | Age: 67
Discharge: HOME | End: 2017-10-05
Attending: OPHTHALMOLOGY | Admitting: OPHTHALMOLOGY
Payer: COMMERCIAL

## 2017-10-05 ENCOUNTER — ANESTHESIA (OUTPATIENT)
Dept: SURGERY | Facility: CLINIC | Age: 67
End: 2017-10-05
Payer: COMMERCIAL

## 2017-10-05 ENCOUNTER — ANESTHESIA EVENT (OUTPATIENT)
Dept: SURGERY | Facility: CLINIC | Age: 67
End: 2017-10-05
Payer: COMMERCIAL

## 2017-10-05 VITALS
HEIGHT: 61 IN | OXYGEN SATURATION: 98 % | WEIGHT: 135 LBS | RESPIRATION RATE: 16 BRPM | TEMPERATURE: 98.7 F | DIASTOLIC BLOOD PRESSURE: 74 MMHG | BODY MASS INDEX: 25.49 KG/M2 | SYSTOLIC BLOOD PRESSURE: 130 MMHG

## 2017-10-05 PROCEDURE — 25000128 H RX IP 250 OP 636: Performed by: NURSE ANESTHETIST, CERTIFIED REGISTERED

## 2017-10-05 PROCEDURE — 40000170 ZZH STATISTIC PRE-PROCEDURE ASSESSMENT II: Performed by: OPHTHALMOLOGY

## 2017-10-05 PROCEDURE — 36000101 ZZH EYE SURGERY LEVEL 3 1ST 30 MIN: Performed by: OPHTHALMOLOGY

## 2017-10-05 PROCEDURE — 37000008 ZZH ANESTHESIA TECHNICAL FEE, 1ST 30 MIN: Performed by: OPHTHALMOLOGY

## 2017-10-05 PROCEDURE — V2632 POST CHMBR INTRAOCULAR LENS: HCPCS | Performed by: OPHTHALMOLOGY

## 2017-10-05 PROCEDURE — 25000128 H RX IP 250 OP 636: Performed by: OPHTHALMOLOGY

## 2017-10-05 PROCEDURE — 27210794 ZZH OR GENERAL SUPPLY STERILE: Performed by: OPHTHALMOLOGY

## 2017-10-05 PROCEDURE — 71000028 ZZH EYE RECOVERY PHASE 2 EACH 15 MINS: Performed by: OPHTHALMOLOGY

## 2017-10-05 PROCEDURE — 25000125 ZZHC RX 250: Performed by: OPHTHALMOLOGY

## 2017-10-05 PROCEDURE — 25000128 H RX IP 250 OP 636: Performed by: ANESTHESIOLOGY

## 2017-10-05 PROCEDURE — 36000135 ZZH KERATOTOMY ARCUATE W FEMTOSECOND LASER/IMAGING FOR ATIOL: Performed by: OPHTHALMOLOGY

## 2017-10-05 DEVICE — EYE IMP IOL AMO PCL TECNIS ZCB00 19.0: Type: IMPLANTABLE DEVICE | Site: EYE | Status: FUNCTIONAL

## 2017-10-05 RX ORDER — MOXIFLOXACIN 5 MG/ML
1 SOLUTION/ DROPS OPHTHALMIC
Status: COMPLETED | OUTPATIENT
Start: 2017-10-05 | End: 2017-10-05

## 2017-10-05 RX ORDER — TROPICAMIDE 10 MG/ML
1 SOLUTION/ DROPS OPHTHALMIC
Status: COMPLETED | OUTPATIENT
Start: 2017-10-05 | End: 2017-10-05

## 2017-10-05 RX ORDER — FENTANYL CITRATE 50 UG/ML
INJECTION, SOLUTION INTRAMUSCULAR; INTRAVENOUS PRN
Status: DISCONTINUED | OUTPATIENT
Start: 2017-10-05 | End: 2017-10-05

## 2017-10-05 RX ORDER — BALANCED SALT SOLUTION 6.4; .75; .48; .3; 3.9; 1.7 MG/ML; MG/ML; MG/ML; MG/ML; MG/ML; MG/ML
SOLUTION OPHTHALMIC PRN
Status: DISCONTINUED | OUTPATIENT
Start: 2017-10-05 | End: 2017-10-05 | Stop reason: HOSPADM

## 2017-10-05 RX ORDER — TIMOLOL MALEATE 5 MG/ML
SOLUTION/ DROPS OPHTHALMIC PRN
Status: DISCONTINUED | OUTPATIENT
Start: 2017-10-05 | End: 2017-10-05 | Stop reason: HOSPADM

## 2017-10-05 RX ORDER — DICLOFENAC SODIUM 1 MG/ML
1 SOLUTION/ DROPS OPHTHALMIC
Status: COMPLETED | OUTPATIENT
Start: 2017-10-05 | End: 2017-10-05

## 2017-10-05 RX ORDER — PREDNISOLONE ACETATE 1 %
SUSPENSION, DROPS(FINAL DOSAGE FORM)(ML) OPHTHALMIC (EYE) PRN
Status: DISCONTINUED | OUTPATIENT
Start: 2017-10-05 | End: 2017-10-05 | Stop reason: HOSPADM

## 2017-10-05 RX ORDER — PROPARACAINE HYDROCHLORIDE 5 MG/ML
SOLUTION/ DROPS OPHTHALMIC PRN
Status: DISCONTINUED | OUTPATIENT
Start: 2017-10-05 | End: 2017-10-05 | Stop reason: HOSPADM

## 2017-10-05 RX ORDER — TETRACAINE HYDROCHLORIDE 5 MG/ML
SOLUTION OPHTHALMIC PRN
Status: DISCONTINUED | OUTPATIENT
Start: 2017-10-05 | End: 2017-10-05 | Stop reason: HOSPADM

## 2017-10-05 RX ORDER — SODIUM CHLORIDE, SODIUM LACTATE, POTASSIUM CHLORIDE, CALCIUM CHLORIDE 600; 310; 30; 20 MG/100ML; MG/100ML; MG/100ML; MG/100ML
INJECTION, SOLUTION INTRAVENOUS CONTINUOUS
Status: DISCONTINUED | OUTPATIENT
Start: 2017-10-05 | End: 2017-10-05 | Stop reason: HOSPADM

## 2017-10-05 RX ORDER — ONDANSETRON 2 MG/ML
INJECTION INTRAMUSCULAR; INTRAVENOUS PRN
Status: DISCONTINUED | OUTPATIENT
Start: 2017-10-05 | End: 2017-10-05

## 2017-10-05 RX ORDER — PROPARACAINE HYDROCHLORIDE 5 MG/ML
1 SOLUTION/ DROPS OPHTHALMIC ONCE
Status: COMPLETED | OUTPATIENT
Start: 2017-10-05 | End: 2017-10-05

## 2017-10-05 RX ORDER — PHENYLEPHRINE HYDROCHLORIDE 25 MG/ML
1 SOLUTION/ DROPS OPHTHALMIC
Status: COMPLETED | OUTPATIENT
Start: 2017-10-05 | End: 2017-10-05

## 2017-10-05 RX ORDER — SODIUM CHLORIDE, SODIUM LACTATE, POTASSIUM CHLORIDE, CALCIUM CHLORIDE 600; 310; 30; 20 MG/100ML; MG/100ML; MG/100ML; MG/100ML
500 INJECTION, SOLUTION INTRAVENOUS CONTINUOUS
Status: DISCONTINUED | OUTPATIENT
Start: 2017-10-05 | End: 2017-10-05 | Stop reason: HOSPADM

## 2017-10-05 RX ORDER — LIDOCAINE HYDROCHLORIDE 10 MG/ML
INJECTION, SOLUTION EPIDURAL; INFILTRATION; INTRACAUDAL; PERINEURAL PRN
Status: DISCONTINUED | OUTPATIENT
Start: 2017-10-05 | End: 2017-10-05 | Stop reason: HOSPADM

## 2017-10-05 RX ADMIN — MOXIFLOXACIN 1 DROP: 5 SOLUTION/ DROPS OPHTHALMIC at 06:32

## 2017-10-05 RX ADMIN — PHENYLEPHRINE HYDROCHLORIDE 1 DROP: 2.5 SOLUTION/ DROPS OPHTHALMIC at 06:32

## 2017-10-05 RX ADMIN — TROPICAMIDE 1 DROP: 10 SOLUTION/ DROPS OPHTHALMIC at 06:25

## 2017-10-05 RX ADMIN — MIDAZOLAM HYDROCHLORIDE 1 MG: 1 INJECTION, SOLUTION INTRAMUSCULAR; INTRAVENOUS at 08:09

## 2017-10-05 RX ADMIN — TROPICAMIDE 1 DROP: 10 SOLUTION/ DROPS OPHTHALMIC at 06:15

## 2017-10-05 RX ADMIN — BALANCED SALT SOLUTION 15 ML: 6.4; .75; .48; .3; 3.9; 1.7 SOLUTION OPHTHALMIC at 07:59

## 2017-10-05 RX ADMIN — DICLOFENAC SODIUM 1 DROP: 1 SOLUTION OPHTHALMIC at 06:25

## 2017-10-05 RX ADMIN — MOXIFLOXACIN 1 DROP: 5 SOLUTION/ DROPS OPHTHALMIC at 06:25

## 2017-10-05 RX ADMIN — TIMOLOL MALEATE 1 DROP: 5 SOLUTION OPHTHALMIC at 08:23

## 2017-10-05 RX ADMIN — PROPARACAINE HYDROCHLORIDE 1 DROP: 5 SOLUTION/ DROPS OPHTHALMIC at 06:15

## 2017-10-05 RX ADMIN — TETRACAINE HYDROCHLORIDE 1 DROP: 5 SOLUTION OPHTHALMIC at 08:09

## 2017-10-05 RX ADMIN — BALANCED SALT SOLUTION 15 ML: 6.4; .75; .48; .3; 3.9; 1.7 SOLUTION OPHTHALMIC at 08:13

## 2017-10-05 RX ADMIN — TROPICAMIDE 1 DROP: 10 SOLUTION/ DROPS OPHTHALMIC at 06:32

## 2017-10-05 RX ADMIN — Medication 0.1 ML: at 08:22

## 2017-10-05 RX ADMIN — ONDANSETRON 4 MG: 2 INJECTION INTRAMUSCULAR; INTRAVENOUS at 08:25

## 2017-10-05 RX ADMIN — EPINEPHRINE 500 ML: 1 INJECTION, SOLUTION, CONCENTRATE INTRAVENOUS at 08:13

## 2017-10-05 RX ADMIN — MIDAZOLAM HYDROCHLORIDE 1 MG: 1 INJECTION, SOLUTION INTRAMUSCULAR; INTRAVENOUS at 08:06

## 2017-10-05 RX ADMIN — SODIUM CHONDROITIN SULFATE / SODIUM HYALURONATE 1 ML: 0.55-0.5 INJECTION INTRAOCULAR at 08:14

## 2017-10-05 RX ADMIN — SODIUM CHLORIDE, SODIUM LACTATE, POTASSIUM CHLORIDE, CALCIUM CHLORIDE: 600; 310; 30; 20 INJECTION, SOLUTION INTRAVENOUS at 08:06

## 2017-10-05 RX ADMIN — POVIDONE-IODINE 20 DROP: 5 SOLUTION OPHTHALMIC at 06:16

## 2017-10-05 RX ADMIN — PROPARACAINE HYDROCHLORIDE 1 DROP: 5 SOLUTION/ DROPS OPHTHALMIC at 07:58

## 2017-10-05 RX ADMIN — DICLOFENAC SODIUM 1 DROP: 1 SOLUTION OPHTHALMIC at 06:32

## 2017-10-05 RX ADMIN — MOXIFLOXACIN 1 DROP: 5 SOLUTION/ DROPS OPHTHALMIC at 06:15

## 2017-10-05 RX ADMIN — FENTANYL CITRATE 25 MCG: 50 INJECTION, SOLUTION INTRAMUSCULAR; INTRAVENOUS at 08:10

## 2017-10-05 RX ADMIN — Medication 1 DROP: at 08:23

## 2017-10-05 RX ADMIN — LIDOCAINE HYDROCHLORIDE 1 ML: 10 INJECTION, SOLUTION EPIDURAL; INFILTRATION; INTRACAUDAL; PERINEURAL at 08:14

## 2017-10-05 RX ADMIN — DICLOFENAC SODIUM 1 DROP: 1 SOLUTION OPHTHALMIC at 06:15

## 2017-10-05 RX ADMIN — PHENYLEPHRINE HYDROCHLORIDE 1 DROP: 2.5 SOLUTION/ DROPS OPHTHALMIC at 06:25

## 2017-10-05 RX ADMIN — PHENYLEPHRINE HYDROCHLORIDE 1 DROP: 2.5 SOLUTION/ DROPS OPHTHALMIC at 06:15

## 2017-10-05 NOTE — IP AVS SNAPSHOT
MRN:[id number]                      After Visit Summary   10/5/2017    Patricia Valentin    MRN: [id number]           Thank you!     Thank you for choosing [facility] for your care. Our goal is always to provide you with excellent care. Hearing back from our patients is one way we can continue to improve our services. Please take a few minutes to complete the written survey that you may receive in the mail after you visit with us. Thank you!        Patient Information     Date Of Birth          1950        About your hospital stay     You were admitted on:  October 5, 2017 You last received care in the:  [facility]    You were discharged on:  October 5, 2017       Who to Call     For medical emergencies, please call 911.  For non-urgent questions about your medical care, please call your primary care provider or clinic, [phone]  For questions related to your surgery, please call your surgery clinic        Attending Provider     Provider Specialty    Merlin Christiansen MD Ophthalmology       Primary Care Provider Office Phone # Fax #    Tamia MOORE Caterina [phone]      Further instructions from your care team       [facility] Discharge  Instructions  [facility]est[facility] ([facility])   Adult Discharge Instructions    For 24 hours after surgery    1. Get plenty of rest.  Make arrangements to have a responsible adult stay with you for at least 6 hours after you leave the hospital.  2. Do not drive or use heavy equipment for 24 hours.    3. Do not drink alcohol for 24 hours.  4. Do not sign legal documents or make important decisions for 24 hours.  5. Avoid strenuous or risky activities. You may feel lightheaded.  If so, sit for a few minutes before standing.  Have someone help you get up.   6. Conscious sedation patients may resume a regular diet..  7. Any questions of medical nature, call your physician.      Dr. Boyer  "Елена  Post Operative Care  Following Cataract Surgery     ? If you have a gauze eye patch on, please do not remove it until it is removed by your physician at your first post-operative visit.    ? If you have a clear eye shield on, you may remove it  and begin eye drops as directed when you get home.    ? Wear the eye shield when sleeping for protection for one week.    ? Do not rub the operated eye.    ? Light sensitivity may be noticed. Sunglasses may be worn for comfort.    ? Some discomfort and irritation may be noticed. Acetaminophen (Tylenol) may be taken for discomfort.    ? Avoid excessive bending over, strenuous activity or heavy lifting until approved by your doctor.    ? Keep the operated eye dry.    ? You may wash your hair, bathe or shower, but keep the operated eye closed while doing so.    ? Use medication exactly as prescribed by your doctor.  You may restart your regular home medications.    ? Bring all materials and medications to the clinic on your first post-operative visit.    ? Call the doctor s office if any of the following occur:     -decrease in vision     -pain beyond mild sensation     -significant redness or discharge      Dr. Christiansen can reached at [phone].      Pending Results     No orders found from 10/3/2017 to 10/6/2017.            Admission Information     Date & Time Provider Department Dept. Phone    10/5/2017 Merlin Christiansen MD [facility] [phone]      Your Vitals Were     Blood Pressure Temperature Respirations Height Weight Last Period    140/77 98.7  F (37.1  C) (Temporal) 16 1.549 m (5' 1\") 61.2 kg (135 lb) 05/07/2006    Pulse Oximetry BMI (Body Mass Index)                98% 25.51 kg/m2          [employer] Information     [employer] lets you send messages to your doctor, view your test results, renew your prescriptions, schedule appointments and more. To sign up, go to [email or web address] . Click on \"Log in\" on the left side of the screen, " "which will take you to the Welcome page. Then click on \"Sign up Now\" on the right side of the page.     You will be asked to enter the access code listed below, as well as some personal information. Please follow the directions to create your username and password.     Your access code is: [email or web address]  Expires: 2017  9:39 AM     Your access code will  in 90 days. If you need help or a new code, please call your [facility] clinic or [phone].        Care EveryWhere ID     This is your Care EveryWhere ID. This could be used by other organizations to access your [facility] medical records  [id number]        Equal Access to Services     SAURABH MARCELINO : Rex Julien, rashel masterson, kyle hananh, ross young. So [facility] [phone].    ATENCIÓN: Si habla español, tiene a michel disposición servicios gratuitos de asistencia lingüística. Llame al [phone].    We comply with applicable federal civil rights laws and [place] laws. We do not discriminate on the basis of race, color, national origin, age, disability, sex, sexual orientation, or gender identity.               Review of your medicines      UNREVIEWED medicines. Ask your doctor about these medicines        Dose / Directions    VITAMIN D PO        Refills:  0                Protect others around you: Learn how to safely use, store and throw away your medicines at www.disposemymeds.org.             Medication List: This is a list of all your medications and when to take them. Check marks below indicate your daily home schedule. Keep this list as a reference.      Medications           Morning Afternoon Evening Bedtime As Needed    VITAMIN D PO                                  "

## 2017-10-05 NOTE — IP AVS SNAPSHOT
[facility]    [address]    [place] [address]    Phone:  [phone]    Fax:  [phone]                                       After Visit Summary   10/5/2017    Patricia Valentin    MRN: [id number]           After Visit Summary Signature Page     I have received my discharge instructions, and my questions have been answered. I have discussed any challenges I see with this plan with the nurse or doctor.    ..........................................................................................................................................  Patient/Patient Representative Signature      ..........................................................................................................................................  Patient Representative Print Name and Relationship to Patient    ..................................................               ................................................  Date                                            Time    ..........................................................................................................................................  Reviewed by Signature/Title    ...................................................              ..............................................  Date                                                            Time

## 2017-10-05 NOTE — ANESTHESIA POSTPROCEDURE EVALUATION
Patient: Patricia Valentin    Procedure(s):  RIGHT EYE FEMTOSECOND LASER ASSISTED PHACOEMULSIFICATION CLEAR CORNEA WITH STANDARD INTRAOCULAR LENS IMPLANT  - Wound Class: I-Clean    Diagnosis:RIGHT EYE CATARACT   Diagnosis Additional Information: No value filed.    Anesthesia Type:  MAC    Note:  Anesthesia Post Evaluation    Patient location during evaluation: PACU  Patient participation: Able to fully participate in evaluation  Level of consciousness: awake  Pain management: adequate  Airway patency: patent  Cardiovascular status: acceptable  Respiratory status: acceptable  Hydration status: acceptable  PONV: none     Anesthetic complications: None          Last vitals:  Vitals:    10/05/17 0624 10/05/17 0832 10/05/17 0844   BP: 122/74 140/77 130/74   Resp: 16 16 16   Temp: 37.1  C (98.7  F)     SpO2: 99% 98% 98%         Electronically Signed By: VIET FALCON MD  October 5, 2017  3:33 PM

## 2017-10-05 NOTE — ANESTHESIA PREPROCEDURE EVALUATION
Anesthesia Evaluation     .             ROS/MED HX    ENT/Pulmonary:      (-) sleep apnea   Neurologic:       Cardiovascular:         METS/Exercise Tolerance:     Hematologic:         Musculoskeletal:         GI/Hepatic:        (-) GERD   Renal/Genitourinary:         Endo:         Psychiatric: Comment: Agoraphobia with panic disorder;    (+) psychiatric history depression      Infectious Disease:         Malignancy:         Other:                     Physical Exam  Normal systems: cardiovascular, pulmonary and dental    Airway   Mallampati: I  TM distance: >3 FB  Neck ROM: full    Dental     Cardiovascular       Pulmonary                     Anesthesia Plan      History & Physical Review  History and physical reviewed and following examination; no interval change.    ASA Status:  1 .    NPO Status:  > 8 hours    Plan for MAC Reason for MAC:  Procedure to face, neck, head or breast  PONV prophylaxis:  Ondansetron (or other 5HT-3)       Postoperative Care  Postoperative pain management:  IV analgesics.      Consents  Anesthetic plan, risks, benefits and alternatives discussed with:  Patient..                          .

## 2017-10-05 NOTE — ANESTHESIA CARE TRANSFER NOTE
Patient: Patricia Valentin    Procedure(s):  RIGHT EYE FEMTOSECOND LASER ASSISTED PHACOEMULSIFICATION CLEAR CORNEA WITH STANDARD INTRAOCULAR LENS IMPLANT  - Wound Class: I-Clean    Diagnosis: RIGHT EYE CATARACT   Diagnosis Additional Information: No value filed.    Anesthesia Type:   MAC     Note:  Airway :Room Air  Patient transferred to:PACU  Comments: [facility]red to [facility] recovery room in recliner with armrests up, spontaneous respirations, O2 saturation maintained at 99% on room air. All monitors and alarms on and functioning, clinically stable vital signs. Report given to recovery RN and questions answered. Patient alert and following verbal directions.      Vitals: (Last set prior to Anesthesia Care Transfer)    CRNA VITALS  10/5/2017 0802 - 10/5/2017 0832      10/5/2017             Resp Rate (set): 10                Electronically Signed By: PREETI Del Rosario CRNA  October 5, 2017  8:32 AM

## 2017-10-05 NOTE — DISCHARGE INSTRUCTIONS
[facility] Discharge  Instructions  Anesthesia ([facility])   Adult Discharge Instructions    For 24 hours after surgery    1. Get plenty of rest.  Make arrangements to have a responsible adult stay with you for at least 6 hours after you leave the hospital.  2. Do not drive or use heavy equipment for 24 hours.    3. Do not drink alcohol for 24 hours.  4. Do not sign legal documents or make important decisions for 24 hours.  5. Avoid strenuous or risky activities. You may feel lightheaded.  If so, sit for a few minutes before standing.  Have someone help you get up.   6. Conscious sedation patients may resume a regular diet..  7. Any questions of medical nature, call your physician.      Dr. Merlin Christiansen  Post Operative Care  Following Cataract Surgery     ? If you have a gauze eye patch on, please do not remove it until it is removed by your physician at your first post-operative visit.    ? If you have a clear eye shield on, you may remove it  and begin eye drops as directed when you get home.    ? Wear the eye shield when sleeping for protection for one week.    ? Do not rub the operated eye.    ? Light sensitivity may be noticed. Sunglasses may be worn for comfort.    ? Some discomfort and irritation may be noticed. Acetaminophen (Tylenol) may be taken for discomfort.    ? Avoid excessive bending over, strenuous activity or heavy lifting until approved by your doctor.    ? Keep the operated eye dry.    ? You may wash your hair, bathe or shower, but keep the operated eye closed while doing so.    ? Use medication exactly as prescribed by your doctor.  You may restart your regular home medications.    ? Bring all materials and medications to the clinic on your first post-operative visit.    ? Call the doctor s office if any of the following occur:     -decrease in vision     -pain beyond mild sensation     -significant redness or discharge      Dr. Christiansen can reached at  [phone].

## 2017-10-05 NOTE — OP NOTE
PATIENT: Patricia Valentin , : 1950     DATE OF SURGERY: 2017     SURGEON: Merlin Christiansen MD     PREOPERATIVE DIAGNOSIS: Cataract, Right eye.     POSTOPERATIVE DIAGNOSIS: Cataract, Right eye.      PROCEDURE : Phacoemulsification with intraocular lens implant in the Right eye.  Femto Laser Assisted.     ANESTHESIA: Topical  with MAC.       INDICATIONS FOR PROCEDURE:  The patient is a 67 year old-year-old female with a history of gradually decreasing vision in the Right eye.  Best corrected visual acuity had dropped to 20/40 causing difficulty with reading and driving.  A complete ophthalmic evaluation demonstrated that the cataract was a significant cause in the decreased visual acuity.  The benefits, risks, and alternatives of cataract surgery  (including: bleeding, infection, loss of vision, loss of the eye, need for further surgery and other rare but other possible side effects of cataract surgery) were discussed with the patient.  The patient expressed understanding of these risks and elected to proceed with surgery.  Written consent was obtained prior to the procedure.      DESCRIPTION OF PROCEDURE :  The patient's right eye was dilated in preinduction with topical drops of cyclopentolate, phenylephrine and flurbiprofen.  Topical antibiotic drops were instilled.      Patient was taken to the femto laser room and the femto laser was used to assist with capsulotomy, fragmentation, arcuate incisions, and incisions.    The patient was taken to the operating room.  A drop of tetracaine and  a drop of 5% iodine solution was placed in the eye.  The operative eye was prepped and draped in the usual sterile manner for intraocular surgery.  A lid speculum was placed to separate the eyelids.     A paracentesis incision was opened at the 2 clock hours left of the temporal position in the Right eye.  One percent preservative free intraocular lidocaine was then instilled in the eye.  Next, the anterior chamber  was deepened with viscoelastic.  A limbal-based clear corneal incision was opened temporally.  A continuous curvilinear anterior capsulorrhexis was then opened using a Utrata forceps.  Next, balanced saline solution on a blunt-tip cannula was used to carry out hydrodissection and hydrodelineation and the lens nucleus was rotated freely within the lens capsule.  Phacoemulsification was then performed in the nucleus and epinucleus.  The residual cortical material was aspirated using the irrigation/aspiration handpiece.      The capsular bag was inspected and found to be intact.  The capsular bag was then deepened with additional viscoelastic.  A posterior chamber intraocular lens, TANK ZCB00, 19.0 diopter,  was inserted into the lens capsule and rotated into position without difficulty.  A dilute mixture of intraocular antibiotic and balanced saline solution 1:10 was injected into the anterior chamber.  The incision was inspected for leaks and found to be sealed.  Topical drops of 5% iodine solution, Moxifloxacin and timolol 0.5% were instilled in the eye.  A Terry shield was placed over the eye.      The patient tolerated the procedure well without complications and was transferred to the PACU in satisfactory condition.  The patient was instructed to keep the shield on the eye and to follow up the next day in the eye clinic for postoperative care.         RIRI MATHEW MD

## 2019-02-26 NOTE — ANESTHESIA CARE TRANSFER NOTE
Patient: Patricia Valentin    Procedure(s):  LEFT EYE PHACOEMULSIFICATION CLEAR CORNEA WITH STANDARD INTRAOCULAR LENS IMPLANT FEMTOSECOND LASER ASSISTED  LEFT EYE VITRECTOMY PARSPLANA WITH 25 GAUGE SYSTEM MEMBRANE PEEL  - Wound Class: I-Clean   - Wound Class: I-Clean    Diagnosis: CATARACT, EPIRETINAL MEMBRANE  Diagnosis Additional Information: No value filed.    Anesthesia Type:   MAC     Note:  Airway :Room Air  Patient transferred to:PACU  Comments: [facility]red to [facility] recovery room in recliner with armrests up, spontaneous respirations, O2 saturation maintained on RA. All monitors and alarms on and functioning, clinically stable vital signs. Report given to recovery RN and questions answered. Patient alert and following verbal directions.      Vitals: (Last set prior to Anesthesia Care Transfer)    CRNA VITALS  8/9/2017 0849 - 8/9/2017 0920      8/9/2017             Resp Rate (observed): (!)  1    Resp Rate (set): 10                Electronically Signed By: PREETI Jacome CRNA  August 9, 2017  9:20 AM  
today

## 2021-05-24 ENCOUNTER — RECORDS - HEALTHEAST (OUTPATIENT)
Dept: ADMINISTRATIVE | Facility: CLINIC | Age: 71
End: 2021-05-24

## 2021-05-25 ENCOUNTER — RECORDS - HEALTHEAST (OUTPATIENT)
Dept: ADMINISTRATIVE | Facility: CLINIC | Age: 71
End: 2021-05-25

## 2021-05-26 ENCOUNTER — RECORDS - HEALTHEAST (OUTPATIENT)
Dept: ADMINISTRATIVE | Facility: CLINIC | Age: 71
End: 2021-05-26

## 2021-05-27 ENCOUNTER — RECORDS - HEALTHEAST (OUTPATIENT)
Dept: ADMINISTRATIVE | Facility: CLINIC | Age: 71
End: 2021-05-27

## 2021-05-30 ENCOUNTER — RECORDS - HEALTHEAST (OUTPATIENT)
Dept: ADMINISTRATIVE | Facility: CLINIC | Age: 71
End: 2021-05-30

## (undated) DEVICE — EYE LENS FLAT VITRECTOMY S5.7010U

## (undated) DEVICE — GLOVE PROTEXIS MICRO 7.0  2D73PM70

## (undated) DEVICE — EYE SHIELD PLASTIC

## (undated) DEVICE — SYR 05ML SLIP TIP W/O NDL 309647

## (undated) DEVICE — LINEN TOWEL PACK X5 5464

## (undated) DEVICE — DRAPE MICROSCOPE DRAPE  EYE DI40001

## (undated) DEVICE — EYE BLADE SCLERAL MVR 25GA 8065912501

## (undated) DEVICE — GLOVE PROTEXIS MICRO 8.5  2D73PM85

## (undated) DEVICE — EYE KNIFE ATOMIC EDGE 2.8MM 45DEG 370159

## (undated) DEVICE — TAPE MICROPORE 2"X1.5YD 1530S-2

## (undated) DEVICE — NDL 18GA 1.5" 305196

## (undated) DEVICE — EYE PACK 25GA PLUS CONSTELLATION PPK4253

## (undated) DEVICE — EYE PACK BVI READYPAK KIT #2

## (undated) DEVICE — EYE KNIFE STILETTO VISITEC 1.1MM ANG 45DEG SIDEPORT 376620

## (undated) DEVICE — TAPE MICROPORE 1"X1.5YD 1530S-1

## (undated) DEVICE — APPLICATOR COTTON TIP 6"X2 STERILE LF 6012

## (undated) DEVICE — EYE CANN IRR 25GA CYSTOTOME 581610

## (undated) DEVICE — EYE PACK CUSTOM ANTERIOR 30DEG TIP CENTURION PPK6682-04

## (undated) DEVICE — SYR 05ML SLIP TIP W/O NDL

## (undated) DEVICE — EYE SOL BSS 500ML

## (undated) DEVICE — EYE CANN IRR 30GA  ANTERIOR CHAMBER 581273

## (undated) DEVICE — EYE FORCEP TIP ADV ILM DISP 25GA 725.44

## (undated) DEVICE — SU PLAIN 6-0 TG140-8 18" 1735G

## (undated) DEVICE — PACK CATARACT CUSTOM SO DALE SEY32CTFCX

## (undated) DEVICE — GLOVE PROTEXIS MICRO 7.5  2D73PM75

## (undated) DEVICE — DRAPE MAYO STAND 23X54 8337

## (undated) DEVICE — EYE CANNULA SOFT TIP 25GA 8065149525

## (undated) DEVICE — GLOVE PROTEXIS MICRO 6.5  2D73PM65

## (undated) DEVICE — EYE NDL RETROBULBAR 25GA 1.5" 581275

## (undated) DEVICE — SYR 01ML 25GA 5/8" TBC

## (undated) DEVICE — PACK VITRECTOMY PQ15VI57E

## (undated) DEVICE — SU ETHILON 10-0 CS160-6 12" 9000G

## (undated) RX ORDER — FENTANYL CITRATE 50 UG/ML
INJECTION, SOLUTION INTRAMUSCULAR; INTRAVENOUS
Status: DISPENSED
Start: 2017-10-05

## (undated) RX ORDER — TETRACAINE HYDROCHLORIDE 5 MG/ML
SOLUTION OPHTHALMIC
Status: DISPENSED
Start: 2017-10-05

## (undated) RX ORDER — ONDANSETRON 2 MG/ML
INJECTION INTRAMUSCULAR; INTRAVENOUS
Status: DISPENSED
Start: 2017-10-05

## (undated) RX ORDER — ONDANSETRON 2 MG/ML
INJECTION INTRAMUSCULAR; INTRAVENOUS
Status: DISPENSED
Start: 2017-08-09

## (undated) RX ORDER — FENTANYL CITRATE 50 UG/ML
INJECTION, SOLUTION INTRAMUSCULAR; INTRAVENOUS
Status: DISPENSED
Start: 2017-08-09

## (undated) RX ORDER — DEXAMETHASONE SODIUM PHOSPHATE 4 MG/ML
INJECTION, SOLUTION INTRA-ARTICULAR; INTRALESIONAL; INTRAMUSCULAR; INTRAVENOUS; SOFT TISSUE
Status: DISPENSED
Start: 2017-10-05

## (undated) RX ORDER — LIDOCAINE HYDROCHLORIDE 10 MG/ML
INJECTION, SOLUTION EPIDURAL; INFILTRATION; INTRACAUDAL; PERINEURAL
Status: DISPENSED
Start: 2017-10-05

## (undated) RX ORDER — DEXAMETHASONE SODIUM PHOSPHATE 4 MG/ML
INJECTION, SOLUTION INTRA-ARTICULAR; INTRALESIONAL; INTRAMUSCULAR; INTRAVENOUS; SOFT TISSUE
Status: DISPENSED
Start: 2017-08-09

## (undated) RX ORDER — TIMOLOL 5 MG/ML
SOLUTION/ DROPS OPHTHALMIC
Status: DISPENSED
Start: 2017-10-05

## (undated) RX ORDER — LIDOCAINE HYDROCHLORIDE 20 MG/ML
INJECTION, SOLUTION EPIDURAL; INFILTRATION; INTRACAUDAL; PERINEURAL
Status: DISPENSED
Start: 2017-10-05